# Patient Record
(demographics unavailable — no encounter records)

---

## 2025-05-02 NOTE — HISTORY OF PRESENT ILLNESS
[Home] : at home, [unfilled] , at the time of the visit. [Medical Office: ([facility])___] : at the medical office located in  [Telehealth (audio & video)] : This visit was provided via telehealth using real-time 2-way audio visual technology. [Verbal consent obtained from patient] : the patient, [unfilled] [[facility]] : Post-hospitalization from [facility] [Asthma] : Asthma [Yes] : Yes [Admitted on: ___] : The patient was admitted on [unfilled] [Discharged on ___] : discharged on [unfilled] [Discharge Summary] : discharge summary [Pertinent Labs] : pertinent labs [Radiology Findings] : radiology findings [Discharge Med List] : discharge medication list [Med Reconciliation] : medication reconciliation has been completed [Patient Contacted By: ____] : and contacted by [unfilled] [FreeTextEntry2] : Patient is a 61y/o M with PMHX pmhx of asthma? (no formal diagnosis and was never on treatment) presented from [facility] for interventional pulmonary evaluation. Patient initially hospitalized at [facility] for worsening SOB. Imaging raised concern for sarcoidosis vs hypersensitivity pneumonitis, and he was started on prednisone, bronchodilators, and empirically treated with itraconazole for possible fungal infection. During [facility] admission, Interventional Pulmonology and Infectious Disease teams evaluated the patient. After thorough review, it was felt that antifungal therapy was not indicated at this time, and itraconazole was discontinued. A bronchoscopy was considered but deferred, as the patient was clinically stable, not hypoxic, and the procedure was not deemed urgently necessary. Risks of bronchoscopy (including bleeding, infection, and pneumothorax) were discussed, and outpatient evaluation will continue. Continue prednisone with a taper as follows: 30 mg daily from 5/1 to 5/3, 20 mg daily from 5/4 to 5/6, 10 mg daily from 5/7 to 5/9, then stop. He was also discharged with instructions to continue Albuterol PRN, and either Advair or Spiriva for maintenance therapy. Outpatient follow-up with Pulmonology is strongly recommended. Patient states he has a hx of occupational exposure. 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 States he has noticed times when he wheezes with exertion during certain times of year but has not impacted his exercise capacity. Recently at his job has been inhaling significant amount of particulate, including fiberglass, which he has later noticed in sputum he had produced.   Upon presentation, is doing well overall, currently denies cough, SOB, wheezing, fever, chills, chest tightness. With no known history of childhood asthma, eczema, frequent URI, history of PNA, or intubation. Currently on prednisone taper, Advair, and albuterol PRN, which he has not used. Has never smoked cigarettes, is a former cannabis smoker, no pets, with extensive exposures to dust, mold, asbestos, fiberglass and other construction materials.

## 2025-05-02 NOTE — ASSESSMENT
[FreeTextEntry1] : 61y/o M with PMHX pmhx of asthma? (no formal diagnosis and was never on treatment) presented from [facility] for interventional pulmonary evaluation. Patient initially hospitalized at [facility] for worsening SOB. Imaging raised concern for sarcoidosis vs hypersensitivity pneumonitis, and he was started on prednisone, bronchodilators, and empirically treated with itraconazole for possible fungal infection. During [facility] admission, Interventional Pulmonology and Infectious Disease teams evaluated the patient. After thorough review, it was felt that antifungal therapy was not indicated at this time, and itraconazole was discontinued. A bronchoscopy was considered but deferred, as the patient was clinically stable, not hypoxic, and the procedure was not deemed urgently necessary. Risks of bronchoscopy (including bleeding, infection, and pneumothorax) were discussed, and outpatient evaluation will continue. Continue prednisone with a taper as follows: 30 mg daily from 5/1 to 5/3, 20 mg daily from 5/4 to 5/6, 10 mg daily from 5/7 to 5/9, then stop. He was also discharged with instructions to continue Albuterol PRN, and either Advair or Spiriva for maintenance therapy. Outpatient follow-up with Pulmonology is strongly recommended. Patient states he has a hx of occupational exposure. 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 States he has noticed times when he wheezes with exertion during certain times of year but has not impacted his exercise capacity. Recently at his job has been inhaling significant amount of particulate, including fiberglass, which he has later noticed in sputum he had produced.   Upon presentation, is doing well overall, currently denies cough, SOB, wheezing, fever, chills, chest tightness. With no known history of childhood asthma, eczema, frequent URI, history of PNA, or intubation. Currently on prednisone taper, Advair, and albuterol PRN, which he has not used. Has never smoked cigarettes, is a former cannabis smoker, no pets, with extensive exposures to dust, mold, asbestos, fiberglass and other construction materials.  -All medications reviewed and reconciled with patient, use as prescribed. -F/U with Pulmonologist, PCP in 1-2 weeks. PFT S/P completion of steroid taper -Advised to call office immediately with any change or worsening of symptoms -Advised to call 911 of go to ER immediately with any S/S of acute respiratory distress or worsening of symptoms -Reviewed F/U CT scan in 6-8 weeks

## 2025-05-02 NOTE — PHYSICAL EXAM
[Normal] : no acute distress, well nourished, well developed and well-appearing [No Respiratory Distress] : no respiratory distress  [No Accessory Muscle Use] : no accessory muscle use [No Focal Deficits] : no focal deficits [Normal Affect] : the affect was normal [Normal Insight/Judgement] : insight and judgment were intact [30755 - High Complexity requires an extensive number of possible diagnoses and/or the management options, extensive complexity of the medical data (tests, etc.) to be reviewed, and a high risk of significant complications, morbidity, and/or mortality as w] : High Complexity

## 2025-05-12 NOTE — HISTORY OF PRESENT ILLNESS
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 CT Chest showed Calcified mediastinal and left hilar lymph node c/w sarcoidosis. Patient was started on duonebs, IV steroids, o2. Pulm recommended advair and further workup for hypersensitivity pneumonitis vs aspergillosis vs sarcoidosis. ACE level low, ANCA neg, quantiferon indeterminate, vit D wnl, immunocap total IgE positive. Aspergillus study neg. Patient started on itraconazole for suspected aspergillosis and coverage of other mold/fungi. Pulm recommended transfer for EBUS with biopsy. Pt's o2 sat went to low 90s on ambulation. He was transitioned to PO steroids with improvement.   Pt sent to [facility] on 4/29 and discharged on 4/30. Decision was made in consultation with interventional pulmonology and ID that antifungal therapy was not indicated and itraconazole was discontinued. Bronchoscopy was deferred as it was deemed not urgently necessary as patient was clinically stable and not hypoxic. Of note patient was also seen for anxiety, he declined SSRI therapy.   Patient was seen by pulm on 5/2 for follow up. He is advised to follow up after steroid taper for PFTs and get f/u CT scan in 6-8wks.

## 2025-05-14 NOTE — PHYSICAL EXAM
[No Acute Distress] : no acute distress [Normal Oropharynx] : normal oropharynx [Normal Appearance] : normal appearance [No Neck Mass] : no neck mass [Normal Rate/Rhythm] : normal rate/rhythm [Normal S1, S2] : normal s1, s2 [No Murmurs] : no murmurs [No Resp Distress] : no resp distress [Clear to Auscultation Bilaterally] : clear to auscultation bilaterally [No Abnormalities] : no abnormalities [Benign] : benign [Normal Gait] : normal gait [No Clubbing] : no clubbing [No Cyanosis] : no cyanosis [No Edema] : no edema [FROM] : FROM [Normal Color/ Pigmentation] : normal color/ pigmentation [No Focal Deficits] : no focal deficits [Oriented x3] : oriented x3 [Normal Affect] : normal affect DISCHARGE

## 2025-05-14 NOTE — HISTORY OF PRESENT ILLNESS
[TextBox_4] : 60M with PMH asthma, extensive occupational exposures had presented to [facility] for dyspnea and was managed for asthma exacerbation. Reports was fixing his ceiling and had a lot of exposure to old ceiling materials and dirt/mold in a short period of time. Started to have dyspnea and wheeze, went to urgent care and was prescribed steroid taper and albuterol and was doing better. Then started working on the ceiling again and got sick again and went to [facility] emergency room. Admitted for respiratory failure. Had abnormal CT so was transferred to [facility] for IP. There was concern for ABPA given mold exposure and was briefly started on itraconazole.  In the hospital had CT with BENITA linear and nodular opacities concerning for HP. Total IgE 230 Highest eosinophil level 240 Asthma profile and aspergillus ab negative Antifungal discontinued based on lab and imaging findings. Bronchoscopy deferred to outpatient. Treated with BD and steroid taper.   Currently doing much better. No symptoms, continues on symbicort bid not needing rescue albuterol.  Has history of marijuana smoking although not as much as earlier in life. Occasional cigars. No cigarettes or vaping. Has not used any since breathing symptoms began.  Had pet cat and cockatiel but none in last 6 months.

## 2025-05-14 NOTE — END OF VISIT
[] : Fellow [FreeTextEntry3] : hospital follow up. nonsmoker, no vaping, but frequent smoking marijuana before this,  no known h/o asthma (but maybe used to wheeze at times), admitted sob, upper lobe findings, could have been acute HS. completed pred. feels much better, no complaints resp. still on the symbicort. nasal congestion  - but says that is just today, not chronic. no pets (had a bird until 6 mo ago). no travel. prior work as a  with a lot of exposures, but no longer. plan for PFT / feno and f/u CT in June [Time Spent: ___ minutes] : I have spent [unfilled] minutes of time on the encounter which excludes teaching and separately reported services.

## 2025-05-14 NOTE — HISTORY OF PRESENT ILLNESS
[TextBox_4] : 60M with PMH asthma, extensive occupational exposures had presented to [facility] for dyspnea and was managed for asthma exacerbation. Reports was fixing his ceiling and had a lot of exposure to old ceiling materials and dirt/mold in a short period of time. Started to have dyspnea and wheeze, went to urgent care and was prescribed steroid taper and albuterol and was doing better. Then started working on the ceiling again and got sick again and went to [facility] emergency room. Admitted for respiratory failure. Had abnormal CT so was transferred to [facility] for IP. There was concern for ABPA given mold exposure and was briefly started on itraconazole.  In the hospital had CT with BENITA linear and nodular opacities concerning for HP. Total IgE 230 Highest eosinophil level 240 Asthma profile and aspergillus ab negative Antifungal discontinued based on lab and imaging findings. Bronchoscopy deferred to outpatient. Treated with BD and steroid taper.   Currently doing much better. No symptoms, continues on symbicort bid not needing rescue albuterol.  Has history of marijuana smoking although not as much as earlier in life. Occasional cigars. No cigarettes or vaping. Has not used any since breathing symptoms began.  Had pet cat and cockatiel but none in last 6 months.

## 2025-05-14 NOTE — ASSESSMENT
[FreeTextEntry1] : 60M with occupational exposures and cigars/marijuana and recent exposure of high-volume dust/dirt/mold/old ceiling materials and had respiratory failure. Treated for asthma exacerbation (and briefly for ABPA) and improved with bronchodilators and steroids. CT more consistent with hypersensitivity pneumonitis - BENITA predominant irregular centrilobular nodular and linear opacities. Story with known exposure and then improvement and then worsening again with re-exposure and now improvement with staying away is very suggestive for acute/subacute HP.   Plan: - repeat CT chest in about 1 month - full PFTs with BD and NiOX - cbc with diff and HP panel today.   RTC 1 Months/PRN   Discussed with Dr. Lisker Matt Federbush, MD PGY5 Fellow Pulmonary/Critical Care Medicine